# Patient Record
Sex: MALE | HISPANIC OR LATINO | URBAN - METROPOLITAN AREA
[De-identification: names, ages, dates, MRNs, and addresses within clinical notes are randomized per-mention and may not be internally consistent; named-entity substitution may affect disease eponyms.]

---

## 2019-02-08 ENCOUNTER — OFFICE VISIT (OUTPATIENT)
Dept: URBAN - METROPOLITAN AREA CLINIC 48 | Facility: CLINIC | Age: 69
End: 2019-02-08
Payer: MEDICARE

## 2019-02-08 DIAGNOSIS — H18.20 CORNEAL EDEMA: ICD-10-CM

## 2019-02-08 DIAGNOSIS — E11.9 TYPE 2 DIABETES MELLITUS WITHOUT COMPLICATIONS: Primary | ICD-10-CM

## 2019-02-08 PROCEDURE — 92004 COMPRE OPH EXAM NEW PT 1/>: CPT | Performed by: OPTOMETRIST

## 2019-02-08 RX ORDER — PREDNISOLONE ACETATE 10 MG/ML
1 % SUSPENSION/ DROPS OPHTHALMIC
Qty: 1 | Refills: 3 | Status: ACTIVE
Start: 2019-02-08

## 2019-02-08 ASSESSMENT — INTRAOCULAR PRESSURE
OD: 12
OS: 10

## 2019-02-08 NOTE — IMPRESSION/PLAN
Impression: Type 2 diabetes mellitus without complications: J70.8. Plan: No retinopathy found on today's examination. Discussed importance of blood sugar, blood pressure and cholesterol control. Letter with today's examination results sent to managing provider.  RTC 1 year for [facility]

## 2019-02-08 NOTE — IMPRESSION/PLAN
Impression: Corneal edema: H18.20. OU Plan: No other noticeable reason for decrease in vision OD - Recommend treating to see if it improves. May require surgical intervention for permanent solution.  

Rx: Prednisolone acetate BID - RTC 3 weeks

## 2019-03-01 ENCOUNTER — OFFICE VISIT (OUTPATIENT)
Dept: URBAN - METROPOLITAN AREA CLINIC 48 | Facility: CLINIC | Age: 69
End: 2019-03-01
Payer: MEDICARE

## 2019-03-01 PROCEDURE — 92012 INTRM OPH EXAM EST PATIENT: CPT | Performed by: OPTOMETRIST

## 2019-03-01 ASSESSMENT — INTRAOCULAR PRESSURE: OD: 12

## 2019-03-01 NOTE — IMPRESSION/PLAN
Impression: Corneal edema: H18.20. Persistent corneal edema - not improved w/ QID pred. Plan: Increase pred to Q1-2hrs at least 8 times per day.  RTC 1 week w/ Carmen Ellis

## 2019-03-08 ENCOUNTER — OFFICE VISIT (OUTPATIENT)
Dept: URBAN - METROPOLITAN AREA CLINIC 48 | Facility: CLINIC | Age: 69
End: 2019-03-08
Payer: MEDICARE

## 2019-03-08 PROCEDURE — 76512 OPH US DX B-SCAN: CPT | Performed by: OPHTHALMOLOGY

## 2019-03-08 PROCEDURE — 92012 INTRM OPH EXAM EST PATIENT: CPT | Performed by: OPHTHALMOLOGY

## 2019-03-08 PROCEDURE — 76514 ECHO EXAM OF EYE THICKNESS: CPT | Performed by: OPHTHALMOLOGY

## 2019-03-08 ASSESSMENT — INTRAOCULAR PRESSURE
OD: 12
OS: 23

## 2019-03-08 NOTE — IMPRESSION/PLAN
Impression: Corneal edema: H18.20. Persistent corneal edema - not improved w/ QID pred. Plan: Increase pred to Q hr Right eye. May refer to Dr. Edwin Ni, patient will bring Appointment card to know th name of the corneal specialist. 

Dr. Morales Simple to call patient back.

## 2019-03-27 ENCOUNTER — OFFICE VISIT (OUTPATIENT)
Dept: URBAN - METROPOLITAN AREA CLINIC 48 | Facility: CLINIC | Age: 69
End: 2019-03-27
Payer: MEDICARE

## 2019-03-27 PROCEDURE — 92012 INTRM OPH EXAM EST PATIENT: CPT | Performed by: OPHTHALMOLOGY

## 2019-03-27 ASSESSMENT — INTRAOCULAR PRESSURE
OD: 10
OS: 14

## 2019-03-27 NOTE — IMPRESSION/PLAN
Impression: Corneal edema: H18.20. Persistent corneal edema - not improved w/ QID pred. Plan: Patient to continue:
-PF q2hrs OD 
-PF qid OS Keep appointment with Dr. Raza Michael tomorrow
can arrange PO with us after surgery. 

RTC  3 weeks F/up with notes from Dr. Raza Michael